# Patient Record
Sex: FEMALE | Race: WHITE | NOT HISPANIC OR LATINO
[De-identification: names, ages, dates, MRNs, and addresses within clinical notes are randomized per-mention and may not be internally consistent; named-entity substitution may affect disease eponyms.]

---

## 2023-04-28 PROBLEM — Z00.00 ENCOUNTER FOR PREVENTIVE HEALTH EXAMINATION: Status: ACTIVE | Noted: 2023-04-28

## 2023-05-09 ENCOUNTER — APPOINTMENT (OUTPATIENT)
Dept: ORTHOPEDIC SURGERY | Facility: CLINIC | Age: 78
End: 2023-05-09
Payer: MEDICARE

## 2023-05-09 ENCOUNTER — NON-APPOINTMENT (OUTPATIENT)
Age: 78
End: 2023-05-09

## 2023-05-09 VITALS — HEIGHT: 59 IN | BODY MASS INDEX: 27.42 KG/M2 | WEIGHT: 136 LBS

## 2023-05-09 PROCEDURE — 99204 OFFICE O/P NEW MOD 45 MIN: CPT

## 2023-05-27 NOTE — HISTORY OF PRESENT ILLNESS
[Right] : right hand dominant [FreeTextEntry1] : She is here with her daughter for evaluation of her right hand. She has noticed a nodule in the middle of her palm that has been there for 6 months to a year. She also reports a cyst on the volar aspect of her third MCP that has been there for 3 months. Patient reports no numbness and tingling. She was referred by her PCP. \par Patient also has some pain and swelling in the DIP ring finger, which today feels better. Her base of the thumb also presents with pain when using, especially with shaking hands. \par She has not received any treatment in the past. She reports hx of arthritis is another joints.

## 2023-05-27 NOTE — ASSESSMENT
[FreeTextEntry1] : 77 year-old F with R middle finger retinacular cyst and Dupuytren's nodule.  Apprised to risks and benefits of monitoring vs aspiration vs excision of cyst and has elected to treat conservatively.  \par \par Plan:\par Annual surveillance of Dupuytren's disease\par AAT\par F/u prn

## 2023-05-27 NOTE — PHYSICAL EXAM
[de-identified] : Constitutional: Alert and in no acute distress. \par Psychiatric: Oriented to person, place, and time. Insight and judgement were intact and the affect was normal. \par Cardiovascular: Regular rate assessed through peripheral pulses. \par Pulmonary: Nonlabored breathing on room air. \par Lymphatics: No peripheral lymphadenopathy appreciated.  \par \par Musculoskeletal:  \par \par Cervical spine mobility is full in all directions.   \par \par No skin changes are noted to the upper extremities. Muscle bulk and contour are within normal limits without evidence of atrophy. On the right third digit there is a subcentimeter retinacular cyst at MPJ with no overlying skin changes. El's test is equal to radial and ulnar perfusion.  Mobility is full about the elbows with flexion and extension. Forearm supination measures 85/85 degrees. Forearm pronation measures 85/85 degrees. Wrist flexion measured 75/75 degrees. Wrist extension measures 65/65 degrees. Digital flexion and extension is full. Thumb opposition is full to the base of the small fingers bilaterally. Thumb abduction measures 5/5 in strength. Interosseous abduction measures 5/5 in strength. Palpable nodule in palm consistent with Dupuytren's nodule. are palpated. No triggering is observed. No tenderness over the thumb CMC articulations is observed. Scaphoid shift test is negative. Finkelstein test is negative. Scapholunate and lunotriquetral ballottement test are negative. Ulnar snuffbox tenderness is negative. Ulnar impingement test is negative. DRUJ piano key test is negative.  \par \par Sensation is intact to light touch in the ulnar, median, and radial nerve distributions. Carpal tunnel provocative maneuvers are negative. Cubital tunnel provocative maneuvers are negative. Fingers are pink and well perfused. Capillary refill is brisk.\par